# Patient Record
Sex: FEMALE | Race: BLACK OR AFRICAN AMERICAN | Employment: PART TIME | URBAN - METROPOLITAN AREA
[De-identification: names, ages, dates, MRNs, and addresses within clinical notes are randomized per-mention and may not be internally consistent; named-entity substitution may affect disease eponyms.]

---

## 2017-10-04 ENCOUNTER — HOSPITAL ENCOUNTER (OUTPATIENT)
Dept: CT IMAGING | Age: 22
Discharge: HOME | End: 2017-10-04
Attending: SPECIALIST
Payer: COMMERCIAL

## 2017-10-04 DIAGNOSIS — R11.2 NAUSEA WITH VOMITING: ICD-10-CM

## 2017-10-04 DIAGNOSIS — K92.1 HEMATOCHEZIA: ICD-10-CM

## 2017-10-04 PROCEDURE — 74011000258 HC RX REV CODE- 258: Performed by: SPECIALIST

## 2017-10-04 PROCEDURE — 74177 CT ABD & PELVIS W/CONTRAST: CPT

## 2017-10-04 PROCEDURE — 74011636320 HC RX REV CODE- 636/320: Performed by: SPECIALIST

## 2017-10-04 RX ORDER — SODIUM CHLORIDE 0.9 % (FLUSH) 0.9 %
10 SYRINGE (ML) INJECTION
Status: COMPLETED | OUTPATIENT
Start: 2017-10-04 | End: 2017-10-04

## 2017-10-04 RX ADMIN — Medication 10 ML: at 09:18

## 2017-10-04 RX ADMIN — IOPAMIDOL 100 ML: 755 INJECTION, SOLUTION INTRAVENOUS at 09:18

## 2017-10-04 RX ADMIN — SODIUM CHLORIDE 100 ML: 900 INJECTION, SOLUTION INTRAVENOUS at 09:18

## 2017-10-05 ENCOUNTER — OFFICE VISIT (OUTPATIENT)
Dept: NEUROLOGY | Age: 22
End: 2017-10-05

## 2017-10-05 VITALS
RESPIRATION RATE: 14 BRPM | BODY MASS INDEX: 44.15 KG/M2 | DIASTOLIC BLOOD PRESSURE: 88 MMHG | HEART RATE: 112 BPM | WEIGHT: 265 LBS | SYSTOLIC BLOOD PRESSURE: 128 MMHG | HEIGHT: 65 IN | OXYGEN SATURATION: 98 %

## 2017-10-05 DIAGNOSIS — H53.8 BLURRED VISION: Primary | ICD-10-CM

## 2017-10-05 DIAGNOSIS — G44.52 NEW DAILY PERSISTENT HEADACHE: ICD-10-CM

## 2017-10-05 DIAGNOSIS — F41.9 ANXIETY: ICD-10-CM

## 2017-10-05 RX ORDER — CALCIUM CARBONATE 200(500)MG
1 TABLET,CHEWABLE ORAL DAILY
COMMUNITY
End: 2021-09-26 | Stop reason: ALTCHOICE

## 2017-10-05 RX ORDER — ESCITALOPRAM OXALATE 10 MG/1
10 TABLET ORAL
Qty: 30 TAB | Refills: 1 | Status: SHIPPED | OUTPATIENT
Start: 2017-10-05 | End: 2021-10-27 | Stop reason: ALTCHOICE

## 2017-10-05 RX ORDER — PHENOL/SODIUM PHENOLATE
20 AEROSOL, SPRAY (ML) MUCOUS MEMBRANE DAILY
COMMUNITY
End: 2021-09-26 | Stop reason: ALTCHOICE

## 2017-10-05 NOTE — LETTER
10/5/2017 3:30 PM 
 
Patient:  Madeline Vasquez YOB: 1995 Date of Visit: 10/5/2017 Dear Brennen Moser MD 
[address] [facility] [address] [id number] VIA Facsimile: [phone] 
 : Thank you for referring Ms. Madeline Vasquez to me for evaluation/treatment. Below are the relevant portions of my assessment and plan of care. If you have questions, please do not hesitate to call me. I look forward to following Ms. Mack along with you. Sincerely, Karrie Mejia MD

## 2017-10-05 NOTE — PATIENT INSTRUCTIONS
Migraine Headache: Care Instructions  Your Care Instructions  Migraines are painful, throbbing headaches that often start on one side of the head. They may cause nausea and vomiting and make you sensitive to light, sound, or smell. Without treatment, migraines can last from 4 hours to a few days. Medicines can help prevent migraines or stop them after they have started. Your doctor can help you find which ones work best for you. Follow-up care is a key part of your treatment and safety. Be sure to make and go to all appointments, and call your doctor if you are having problems. It's also a good idea to know your test results and keep a list of the medicines you take. How can you care for yourself at home? · Do not drive if you have taken a prescription pain medicine. · Rest in a quiet, dark room until your headache is gone. Close your eyes, and try to relax or go to sleep. Don't watch TV or read. · Put a cold, moist cloth or cold pack on the painful area for 10 to 20 minutes at a time. Put a thin cloth between the cold pack and your skin. · Use a warm, moist towel or a heating pad set on low to relax tight shoulder and neck muscles. · Have someone gently massage your neck and shoulders. · Take your medicines exactly as prescribed. Call your doctor if you think you are having a problem with your medicine. You will get more details on the specific medicines your doctor prescribes. · Be careful not to take pain medicine more often than the instructions allow. You could get worse or more frequent headaches when the medicine wears off. To prevent migraines  · Keep a headache diary so you can figure out what triggers your headaches. Avoiding triggers may help you prevent headaches. Record when each headache began, how long it lasted, and what the pain was like.  (Was it throbbing, aching, stabbing, or dull?) Write down any other symptoms you had with the headache, such as nausea, flashing lights or dark spots, or sensitivity to bright light or loud noise. Note if the headache occurred near your period. List anything that might have triggered the headache. Triggers may include certain foods (chocolate, cheese, wine) or odors, smoke, bright light, stress, or lack of sleep. · If your doctor has prescribed medicine for your migraines, take it as directed. You may have medicine that you take only when you get a migraine and medicine that you take all the time to help prevent migraines. ¨ If your doctor has prescribed medicine for when you get a headache, take it at the first sign of a migraine, unless your doctor has given you other instructions. ¨ If your doctor has prescribed medicine to prevent migraines, take it exactly as prescribed. Call your doctor if you think you are having a problem with your medicine. · Find healthy ways to deal with stress. Migraines are most common during or right after stressful times. Take time to relax before and after you do something that has caused a migraine in the past.  · Try to keep your muscles relaxed by keeping good posture. Check your jaw, face, neck, and shoulder muscles for tension. Try to relax them. When you sit at a desk, change positions often. And make sure to stretch for 30 seconds each hour. · Get plenty of sleep and exercise. · Eat meals on a regular schedule. Avoid foods and drinks that often trigger migraines. These include chocolate, alcohol (especially red wine and port), aspartame, monosodium glutamate (MSG), and some additives found in foods (such as hot dogs, cheung, cold cuts, aged cheeses, and pickled foods). · Limit caffeine. Don't drink too much coffee, tea, or soda. But don't quit caffeine suddenly. That can also give you migraines. · Do not smoke or allow others to smoke around you. If you need help quitting, talk to your doctor about stop-smoking programs and medicines. These can increase your chances of quitting for good.   · If you are taking birth control pills or hormone therapy, talk to your doctor about whether they are triggering your migraines. When should you call for help? Call 911 anytime you think you may need emergency care. For example, call if:  · You have signs of a stroke. These may include:  ¨ Sudden numbness, paralysis, or weakness in your face, arm, or leg, especially on only one side of your body. ¨ Sudden vision changes. ¨ Sudden trouble speaking. ¨ Sudden confusion or trouble understanding simple statements. ¨ Sudden problems with walking or balance. ¨ A sudden, severe headache that is different from past headaches. Call your doctor now or seek immediate medical care if:  · You have new or worse nausea and vomiting. · You have a new or higher fever. · Your headache gets much worse. Watch closely for changes in your health, and be sure to contact your doctor if:  · You are not getting better after 2 days (48 hours). Where can you learn more? Go to http://patrick-john.info/. Enter L007 in the search box to learn more about \"Migraine Headache: Care Instructions. \"  Current as of: October 14, 2016  Content Version: 11.3  © 8994-8551 [employer]. Care instructions adapted under license by [employer] (which disclaims liability or warranty for this information). If you have questions about a medical condition or this instruction, always ask your healthcare professional. [address] any warranty or liability for your use of this information.

## 2017-10-05 NOTE — MR AVS SNAPSHOT
Visit Information Date & Time Provider Department Dept. Phone Encounter #  
 10/5/2017  3:00 PM Fredrick Stevens MD [facility] at [address] [id number] Follow-up Instructions Return in about 2 months (around 12/5/2017). Upcoming Health Maintenance Date Due Hepatitis A Peds Age 1-18 (1 of 2 - Standard Series) 11/14/1998 DTaP/Tdap/Td series (1 - Tdap) 11/14/2004 HPV AGE 9Y-26Y (1 of 3 - Female 3 Dose Series) 11/14/2008 INFLUENZA AGE 9 TO ADULT 8/1/2017 Allergies as of 10/5/2017  Review Complete On: 10/5/2017 By: Fredrick Stevens MD  
 No Known Allergies Current Immunizations  Never Reviewed No immunizations on file. Not reviewed this visit You Were Diagnosed With   
  
 Codes Comments Blurred vision    -  Primary ICD-10-CM: H53.8 ICD-9-CM: 368.8 New daily persistent headache     ICD-10-CM: G44.52 
ICD-9-CM: 339.42 Anxiety     ICD-10-CM: F41.9 ICD-9-CM: 300.00 Vitals BP Pulse Resp Height(growth percentile) Weight(growth percentile) SpO2  
 128/88 (96 %/ 99 %)* (!) 112 14 5' 5\" (1.651 m) (61 %, Z= 0.27) 265 lb (120.2 kg) (>99 %, Z= 2.64) 98% BMI OB Status Smoking Status 44.1 kg/m2 (99 %, Z= 2.27) Unknown Never Smoker *BP percentiles are based on NHBPEP's 4th Report Growth percentiles are based on CDC 2-20 Years data. Vitals History BMI and BSA Data Body Mass Index Body Surface Area  
 44.1 kg/m 2 2.35 m 2 Your Updated Medication List  
  
   
This list is accurate as of: 10/5/17  3:23 PM.  Always use your most recent med list.  
  
  
  
  
 calcium carbonate 200 mg calcium (500 mg) Sienna Franklin Commonly known as:  TUMS Take 1 Tab by mouth daily. escitalopram oxalate 10 mg tablet Commonly known as:  Anuj Basket Take 1 Tab by mouth nightly. LO LOESTRIN FE PO Take  by mouth. Omeprazole delayed release 20 mg tablet Commonly known as:  PRILOSEC D/R Take 20 mg by mouth daily. Prescriptions Printed Refills  
 escitalopram oxalate (LEXAPRO) 10 mg tablet 1 Sig: Take 1 Tab by mouth nightly. Class: Print Route: Oral  
  
We Performed the Following REFERRAL TO OPHTHALMOLOGY [REF57 Custom] Comments:  
 Daily HA, floaters/blurred vision Follow-up Instructions Return in about 2 months (around 12/5/2017). To-Do List   
 10/09/2017 Imaging:  MRI BRAIN WO CONT Patient Instructions Migraine Headache: Care Instructions Your Care Instructions Migraines are painful, throbbing headaches that often start on one side of the head. They may cause nausea and vomiting and make you sensitive to light, sound, or smell. Without treatment, migraines can last from 4 hours to a few days. Medicines can help prevent migraines or stop them after they have started. Your doctor can help you find which ones work best for you. Follow-up care is a key part of your treatment and safety. Be sure to make and go to all appointments, and call your doctor if you are having problems. It's also a good idea to know your test results and keep a list of the medicines you take. How can you care for yourself at home? · Do not drive if you have taken a prescription pain medicine. · Rest in a quiet, dark room until your headache is gone. Close your eyes, and try to relax or go to sleep. Don't watch TV or read. · Put a cold, moist cloth or cold pack on the painful area for 10 to 20 minutes at a time. Put a thin cloth between the cold pack and your skin. · Use a warm, moist towel or a heating pad set on low to relax tight shoulder and neck muscles. · Have someone gently massage your neck and shoulders. · Take your medicines exactly as prescribed. Call your doctor if you think you are having a problem with your medicine. You will get more details on the specific medicines your doctor prescribes. · Be careful not to take pain medicine more often than the instructions allow. You could get worse or more frequent headaches when the medicine wears off. To prevent migraines · Keep a headache diary so you can figure out what triggers your headaches. Avoiding triggers may help you prevent headaches. Record when each headache began, how long it lasted, and what the pain was like. (Was it throbbing, aching, stabbing, or dull?) Write down any other symptoms you had with the headache, such as nausea, flashing lights or dark spots, or sensitivity to bright light or loud noise. Note if the headache occurred near your period. List anything that might have triggered the headache. Triggers may include certain foods (chocolate, cheese, wine) or odors, smoke, bright light, stress, or lack of sleep. · If your doctor has prescribed medicine for your migraines, take it as directed. You may have medicine that you take only when you get a migraine and medicine that you take all the time to help prevent migraines. ¨ If your doctor has prescribed medicine for when you get a headache, take it at the first sign of a migraine, unless your doctor has given you other instructions. ¨ If your doctor has prescribed medicine to prevent migraines, take it exactly as prescribed. Call your doctor if you think you are having a problem with your medicine. · Find healthy ways to deal with stress. Migraines are most common during or right after stressful times. Take time to relax before and after you do something that has caused a migraine in the past. 
· Try to keep your muscles relaxed by keeping good posture. Check your jaw, face, neck, and shoulder muscles for tension. Try to relax them. When you sit at a desk, change positions often. And make sure to stretch for 30 seconds each hour. · Get plenty of sleep and exercise. · Eat meals on a regular schedule.  Avoid foods and drinks that often trigger migraines. These include chocolate, alcohol (especially red wine and port), aspartame, monosodium glutamate (MSG), and some additives found in foods (such as hot dogs, cheung, cold cuts, aged cheeses, and pickled foods). · Limit caffeine. Don't drink too much coffee, tea, or soda. But don't quit caffeine suddenly. That can also give you migraines. · Do not smoke or allow others to smoke around you. If you need help quitting, talk to your doctor about stop-smoking programs and medicines. These can increase your chances of quitting for good. · If you are taking birth control pills or hormone therapy, talk to your doctor about whether they are triggering your migraines. When should you call for help? Call 911 anytime you think you may need emergency care. For example, call if: 
· You have signs of a stroke. These may include: 
¨ Sudden numbness, paralysis, or weakness in your face, arm, or leg, especially on only one side of your body. ¨ Sudden vision changes. ¨ Sudden trouble speaking. ¨ Sudden confusion or trouble understanding simple statements. ¨ Sudden problems with walking or balance. ¨ A sudden, severe headache that is different from past headaches. Call your doctor now or seek immediate medical care if: 
· You have new or worse nausea and vomiting. · You have a new or higher fever. · Your headache gets much worse. Watch closely for changes in your health, and be sure to contact your doctor if: 
· You are not getting better after 2 days (48 hours). Where can you learn more? Go to http://patrick-john.info/. Enter U982 in the search box to learn more about \"Migraine Headache: Care Instructions. \" Current as of: October 14, 2016 Content Version: 11.3 © 8106-7743 [employer]. Care instructions adapted under license by [employer] (which disclaims liability or warranty for this information).  If you have questions about a medical condition or this instruction, always ask your healthcare professional. [place] [address] any warranty or liability for your use of this information. Introducing [facility]! [employer] introduces Heroic patient portal. Now you can access parts of your medical record, email your doctor's office, and request medication refills online. 1. In your internet browser, go to [email or web address] 2. Click on the First Time User? Click Here link in the Sign In box. You will see the New Member Sign Up page. 3. Enter your Heroic Access Code exactly as it appears below. You will not need to use this code after youve completed the sign-up process. If you do not sign up before the expiration date, you must request a new code. · Heroic Access Code: [email or web address] Expires: 1/3/2018  2:44 PM 
 
4. Enter the last four digits of your Social Security Number (xxxx) and Date of Birth (mm/dd/yyyy) as indicated and click Submit. You will be taken to the next sign-up page. 5. Create a Heroic ID. This will be your Heroic login ID and cannot be changed, so think of one that is secure and easy to remember. 6. Create a Heroic password. You can change your password at any time. 7. Enter your Password Reset Question and Answer. This can be used at a later time if you forget your password. 8. Enter your e-mail address. You will receive e-mail notification when new information is available in [address] 9. Click Sign Up. You can now view and download portions of your medical record. 10. Click the Download Summary menu link to download a portable copy of your medical information. If you have questions, please visit the Frequently Asked Questions section of the Heroic website. Remember, Heroic is NOT to be used for urgent needs. For medical emergencies, dial 911. Now available from your iPhone and Android! Please provide this summary of care documentation to your next provider. Your primary care clinician is listed as [address]. If you have any questions after today's visit, please call [phone].

## 2017-10-05 NOTE — PROGRESS NOTES
Patient is here for possible migraines and \"floaters\"  Symptoms for last months  Having floaters all day every day  Will get headaches sometimes  Worse when bright

## 2017-10-05 NOTE — PROGRESS NOTES
Chief Complaint   Patient presents with    Migraine         HISTORY OF PRESENT ILLNESS  Shantell Arias is a 25 y.o. female who came in for neurological consultation requested by Dr. Isabella Harley. She has had mild headaches off and on in the past but for the past month and a half or so she has had a headache almost on a daily basis sometimes it gets quite intense and she is unable to function. She describes that as localized pain on one side, associated with light sensitivity, noise sensitivity, nausea and vomiting. No other associated neurological symptoms. She has kept a headache of intensity 5/10 almost on a daily basis. She has not been getting enough sleeps and barely gets 2-3 hours per night. She has also been feeling very anxious and restless and at times sad. She was prescribed escitalopram but she was concerned about the side effects and has not started taking it yet. She does not take anything for her migraines and just tries to cover her eyes and rest.  Sometimes the headache will wake her up or keep her up at night. She is also having blurring of vision which is a persistent thing. She has been seeing floaters in front of her eyes and recently saw optometrist and her exam was negative. Past Medical History:   Diagnosis Date    Headache      Current Outpatient Prescriptions   Medication Sig    Omeprazole delayed release (PRILOSEC D/R) 20 mg tablet Take 20 mg by mouth daily.  calcium carbonate (TUMS) 200 mg calcium (500 mg) chew Take 1 Tab by mouth daily.  NORETHINDRONE-E.ESTRADIOL-IRON (LO LOESTRIN FE PO) Take  by mouth.  escitalopram oxalate (LEXAPRO) 10 mg tablet Take 1 Tab by mouth nightly. No current facility-administered medications for this visit. No Known Allergies  History reviewed. No pertinent family history. Social History   Substance Use Topics    Smoking status: Never Smoker    Smokeless tobacco: Never Used    Alcohol use No     History reviewed.  No pertinent surgical history. REVIEW OF SYSTEMS  Review of Systems - History obtained from the patient  Psychological ROS: negative  ENT ROS: negative  Hematological and Lymphatic ROS: negative  Endocrine ROS: negative  Respiratory ROS: no cough, shortness of breath, or wheezing  Cardiovascular ROS: no chest pain or dyspnea on exertion  Gastrointestinal ROS: no abdominal pain, change in bowel habits, or black or bloody stools  Genito-Urinary ROS: no dysuria, trouble voiding, or hematuria  Musculoskeletal ROS: negative  Dermatological ROS: negative      PHYSICAL EXAMINATION:    Visit Vitals    /88    Pulse (!) 112    Resp 14    Ht 5' 5\" (1.651 m)    Wt 120.2 kg (265 lb)    SpO2 98%    BMI 44.1 kg/m2     General:  Well defined, nourished, and groomed individual in no acute distress. Neck: Supple, nontender, thyroid within normal limits, no JVD, no bruits, no pain with resistance to active range of motion. Heart: Regular rate and rhythm, no murmurs, rub, or gallop. Normal S1S2. Lungs:  Clear to auscultation bilaterally with equal chest expansion, no cough, no wheeze  Musculoskeletal:  Extremities revealed no edema and had full range of motion of joints. Psych:  Good mood and bright affect    NEUROLOGICAL EXAMINATION:     Mental Status:   Alert and oriented to person, place, and time with recent and remote memory intact. Attention span and concentration are normal. Speech is fluent with a full fund of knowledge. Cranial Nerves:    II, III, IV, VI:  Visual acuity grossly intact. Visual fields are normal.    Pupils are equal, round, and reactive to light and accommodation. Extra-ocular movements are full and fluid. Fundoscopic exam was benign, no ptosis or nystagmus. V-XII: Hearing is grossly intact. Facial features are symmetric, with normal sensation and strength. The palate rises symmetrically and the tongue protrudes midline. Sternocleidomastoids 5/5.       Motor Examination: Normal tone, bulk, and strength. 5/5 muscle strength throughout. No cogwheel rigidity or clonus present. Sensory exam:  Normal throughout to pinprick, temperature, and vibration sense. Normal proprioception. Coordination:  Heel-to-shin was smooth and symmetrical bilaterally. Finger to nose and rapid arm movement testing was normal.   No resting or intention tremor    Gait and Station:  Steady while walking on toes, heels, and with tandem walking. Normal arm swing. No Rhomberg or pronator drift. No muscle wasting or fasiculations noted. Reflexes:  DTRs 2+ throughout. Toes downgoing. ASSESSMENT    ICD-10-CM ICD-9-CM    1. Blurred vision H53.8 368.8 MRI BRAIN WO CONT      REFERRAL TO OPHTHALMOLOGY   2. New daily persistent headache G44.52 339.42 MRI BRAIN WO CONT      REFERRAL TO OPHTHALMOLOGY   3. Anxiety F41.9 300.00 escitalopram oxalate (LEXAPRO) 10 mg tablet       DISCUSSION  Ms. Carroll Sarkar has had a daily persistent headache for the past 6 weeks associated with blurry vision and seeing floaters in front of her eyes. Although this may be a transformed migraine but she does not have a clear history of migraines and given associated visual symptoms and negative fundus exam I have recommended brain imaging and also detailed ophthalmology evaluation. She should take ibuprofen or naproxen as needed for headache exacerbation and start Lexapro at bedtime  Follow-up in 6- 8 weeks    Thank you for allowing me to participate in the care of Ms. Mack. Please feel free to contact me if you have any questions. I will be happy to follow to follow her along with you.       Ivania Mann MD  Diplomate, American Board of Psychiatry & Neurology (Neurology)  [place] Board of Psychiatry & Neurology (Clinical Neurophysiology)  Diplomate, American Board of Electrodiagnostic Medicine

## 2017-10-16 ENCOUNTER — HOSPITAL ENCOUNTER (OUTPATIENT)
Dept: MRI IMAGING | Age: 22
Discharge: HOME | End: 2017-10-16
Attending: PSYCHIATRY & NEUROLOGY
Payer: COMMERCIAL

## 2017-10-16 DIAGNOSIS — G44.52 NEW DAILY PERSISTENT HEADACHE: ICD-10-CM

## 2017-10-16 DIAGNOSIS — H53.8 BLURRED VISION: ICD-10-CM

## 2017-10-16 PROCEDURE — 70551 MRI BRAIN STEM W/O DYE: CPT

## 2017-10-17 ENCOUNTER — TELEPHONE (OUTPATIENT)
Dept: NEUROLOGY | Age: 22
End: 2017-10-17

## 2017-10-17 NOTE — TELEPHONE ENCOUNTER
----- Message from Rui Stephens MD sent at 10/17/2017  8:37 AM EDT -----  MRI brain was normal.  Please inform.

## 2017-12-05 ENCOUNTER — OFFICE VISIT (OUTPATIENT)
Dept: NEUROLOGY | Age: 22
End: 2017-12-05

## 2017-12-05 VITALS
BODY MASS INDEX: 44.98 KG/M2 | SYSTOLIC BLOOD PRESSURE: 130 MMHG | HEIGHT: 65 IN | DIASTOLIC BLOOD PRESSURE: 80 MMHG | WEIGHT: 270 LBS | OXYGEN SATURATION: 96 % | RESPIRATION RATE: 14 BRPM | HEART RATE: 103 BPM

## 2017-12-05 DIAGNOSIS — R51.9 DAILY HEADACHE: Primary | ICD-10-CM

## 2017-12-05 DIAGNOSIS — H93.A9 PULSATILE TINNITUS: ICD-10-CM

## 2017-12-05 DIAGNOSIS — H53.8 BLURRY VISION: ICD-10-CM

## 2017-12-05 NOTE — PATIENT INSTRUCTIONS

## 2017-12-05 NOTE — PROGRESS NOTES
Patient is still getting migraines  Still getting floaters multiple times a week.  Not always having the pain

## 2017-12-05 NOTE — MR AVS SNAPSHOT
Visit Information Date & Time Provider Department Dept. Phone Encounter #  
 12/5/2017  3:30 PM Esau Bryson MD [facility] [place] [phone] Follow-up Instructions Return in about 2 months (around 2/5/2018). Upcoming Health Maintenance Date Due Hepatitis A Peds Age 1-18 (1 of 2 - Standard Series) 11/14/1998 DTaP/Tdap/Td series (1 - Tdap) 11/14/2004 HPV AGE 9Y-26Y (1 of 3 - Female 3 Dose Series) 11/14/2008 Influenza Age 5 to Adult 8/1/2017 Allergies as of 12/5/2017  Review Complete On: 12/5/2017 By: Esau Bryson MD  
 No Known Allergies Current Immunizations  Never Reviewed No immunizations on file. Not reviewed this visit You Were Diagnosed With   
  
 Codes Comments Daily headache    -  Primary ICD-10-CM: Q97 ICD-9-CM: 784.0 Pulsatile tinnitus     ICD-10-CM: H93. A9 ICD-9-CM: 388.30 Blurry vision     ICD-10-CM: H53.8 ICD-9-CM: 368.8 Vitals BP Pulse Resp Height(growth percentile) Weight(growth percentile) SpO2  
 130/80 (!) 103 14 5' 5\" (1.651 m) 270 lb (122.5 kg) 96% BMI OB Status Smoking Status 44.93 kg/m2 Unknown Never Smoker Vitals History BMI and BSA Data Body Mass Index Body Surface Area 44.93 kg/m 2 2.37 m 2 Your Updated Medication List  
  
   
This list is accurate as of: 12/5/17  4:07 PM.  Always use your most recent med list.  
  
  
  
  
 calcium carbonate 200 mg calcium (500 mg) Somerton Presser Commonly known as:  TUMS Take 1 Tab by mouth daily. escitalopram oxalate 10 mg tablet Commonly known as:  Shan Barrs Take 1 Tab by mouth nightly. LO LOESTRIN FE PO Take  by mouth. Omeprazole delayed release 20 mg tablet Commonly known as:  PRILOSEC D/R Take 20 mg by mouth daily. Follow-up Instructions Return in about 2 months (around 2/5/2018). To-Do List   
 12/11/2017 Imaging:  MRA BRAIN W WO CONT   
  
 12/11/2017 Imaging:  MRA BRAIN WO CONT Referral Information Referral ID Referred By Referred To  
  
 [id number] Troy SALAZAR Not Available Visits Status Start Date End Date 1 New Request 12/5/17 12/5/18 If your referral has a status of pending review or denied, additional information will be sent to support the outcome of this decision. Referral ID Referred By Referred To  
 [id number] Troy SALAZAR Not Available Visits Status Start Date End Date 1 New Request 12/5/17 12/5/18 If your referral has a status of pending review or denied, additional information will be sent to support the outcome of this decision. Patient Instructions A Healthy Lifestyle: Care Instructions Your Care Instructions A healthy lifestyle can help you feel good, stay at a healthy weight, and have plenty of energy for both work and play. A healthy lifestyle is something you can share with your whole family. A healthy lifestyle also can lower your risk for serious health problems, such as high blood pressure, heart disease, and diabetes. You can follow a few steps listed below to improve your health and the health of your family. Follow-up care is a key part of your treatment and safety. Be sure to make and go to all appointments, and call your doctor if you are having problems. It's also a good idea to know your test results and keep a list of the medicines you take. How can you care for yourself at home? · Do not eat too much sugar, fat, or fast foods. You can still have dessert and treats now and then. The goal is moderation. · Start small to improve your eating habits. Pay attention to portion sizes, drink less juice and soda pop, and eat more fruits and vegetables. ¨ Eat a healthy amount of food. A 3-ounce serving of meat, for example, is about the size of a deck of cards.  Fill the rest of your plate with vegetables and whole grains. ¨ Limit the amount of soda and sports drinks you have every day. Drink more water when you are thirsty. ¨ Eat at least 5 servings of fruits and vegetables every day. It may seem like a lot, but it is not hard to reach this goal. A serving or helping is 1 piece of fruit, 1 cup of vegetables, or 2 cups of leafy, raw vegetables. Have an apple or some carrot sticks as an afternoon snack instead of a candy bar. Try to have fruits and/or vegetables at every meal. 
· Make exercise part of your daily routine. You may want to start with simple activities, such as walking, bicycling, or slow swimming. Try to be active 30 to 60 minutes every day. You do not need to do all 30 to 60 minutes all at once. For example, you can exercise 3 times a day for 10 or 20 minutes. Moderate exercise is safe for most people, but it is always a good idea to talk to your doctor before starting an exercise program. 
· Keep moving. Tad Arielle the lawn, work in the garden, or Z[employer]ow. Take the stairs instead of the elevator at work. · If you smoke, quit. People who smoke have an increased risk for heart attack, stroke, cancer, and other lung illnesses. Quitting is hard, but there are ways to boost your chance of quitting tobacco for good. ¨ Use nicotine gum, patches, or lozenges. ¨ Ask your doctor about stop-smoking programs and medicines. ¨ Keep trying. In addition to reducing your risk of diseases in the future, you will notice some benefits soon after you stop using tobacco. If you have shortness of breath or asthma symptoms, they will likely get better within a few weeks after you quit. · Limit how much alcohol you drink. Moderate amounts of alcohol (up to 2 drinks a day for men, 1 drink a day for women) are okay. But drinking too much can lead to liver problems, high blood pressure, and other health problems. Family health If you have a family, there are many things you can do together to improve your health. · Eat meals together as a family as often as possible. · Eat healthy foods. This includes fruits, vegetables, lean meats and dairy, and whole grains. · Include your family in your fitness plan. Most people think of activities such as jogging or tennis as the way to fitness, but there are many ways you and your family can be more active. Anything that makes you breathe hard and gets your heart pumping is exercise. Here are some tips: 
¨ Walk to do errands or to take your child to school or the bus. ¨ Go for a family bike ride after dinner instead of watching TV. Where can you learn more? Go to http://patrickBanister Worksjohn.info/. Enter H496 in the search box to learn more about \"A Healthy Lifestyle: Care Instructions. \" Current as of: May 12, 2017 Content Version: 11.4 © 5020-7080 [employer]. Care instructions adapted under license by [employer] (which disclaims liability or warranty for this information). If you have questions about a medical condition or this instruction, always ask your healthcare professional. [place] [address] any warranty or liability for your use of this information. Introducing [facility]! [address] introduces Socialize patient portal. Now you can access parts of your medical record, email your doctor's office, and request medication refills online. 1. In your internet browser, go to [email or web address] 2. Click on the First Time User? Click Here link in the Sign In box. You will see the New Member Sign Up page. 3. Enter your Socialize Access Code exactly as it appears below. You will not need to use this code after youve completed the sign-up process. If you do not sign up before the expiration date, you must request a new code. · Socialize Access Code: [email or web address] Expires: 1/3/2018  1:44 PM 
 
4.  Enter the last four digits of your Social Security Number (xxxx) and Date of Birth (mm/dd/yyyy) as indicated and click Submit. You will be taken to the next sign-up page. 5. Create a [employer] ID. This will be your [employer] login ID and cannot be changed, so think of one that is secure and easy to remember. 6. Create a [employer] password. You can change your password at any time. 7. Enter your Password Reset Question and Answer. This can be used at a later time if you forget your password. 8. Enter your e-mail address. You will receive e-mail notification when new information is available in [address] 9. Click Sign Up. You can now view and download portions of your medical record. 10. Click the Download Summary menu link to download a portable copy of your medical information. If you have questions, please visit the Frequently Asked Questions section of the [employer] website. Remember, [employer] is NOT to be used for urgent needs. For medical emergencies, dial 911. Now available from your iPhone and Android! Please provide this summary of care documentation to your next provider. Your primary care clinician is listed as [address]. If you have any questions after today's visit, please call [phone].

## 2017-12-05 NOTE — PROGRESS NOTES
Chief Complaint   Patient presents with    Migraine         HISTORY OF PRESENT ILLNESS  Radha Low 89 came back for follow-up. She says that she is doing slightly better from headache standpoint but continues to experiencing blurry vision and floaters off and on. She did see ophthalmology and her visual field testing was normal.  There was no disc edema. She had an MRI scan of the brain that came back normal.  She is experiencing ringing sounds in her ears that sometimes is in sync with the heartbeat. This seems to be the most bothersome symptom. She was prescribed Lexapro to help with anxiety and to sleep at night but she never took it as she was concerned about the side effects. She has been talking to a counselor and is feeling somewhat better. She started having headaches about 3 months ago and was having them almost on a daily basis sometimes it gets quite intense and she is unable to function. She describes that as localized pain on one side, associated with light sensitivity, noise sensitivity, nausea and vomiting. She has not been getting enough sleeps and barely gets 2-3 hours per night. She has also been feeling very anxious and restless and at times sad. Past Medical History:   Diagnosis Date    Headache      Current Outpatient Prescriptions   Medication Sig    Omeprazole delayed release (PRILOSEC D/R) 20 mg tablet Take 20 mg by mouth daily.  calcium carbonate (TUMS) 200 mg calcium (500 mg) chew Take 1 Tab by mouth daily.  NORETHINDRONE-E.ESTRADIOL-IRON (LO LOESTRIN FE PO) Take  by mouth.  escitalopram oxalate (LEXAPRO) 10 mg tablet Take 1 Tab by mouth nightly. No current facility-administered medications for this visit.         PHYSICAL EXAMINATION:    Visit Vitals    /80    Pulse (!) 103    Resp 14    Ht 5' 5\" (1.651 m)    Wt 122.5 kg (270 lb)    SpO2 96%    BMI 44.93 kg/m2     NEUROLOGICAL EXAMINATION:     Mental Status:   Alert and oriented to person, place, and time with recent and remote memory intact. Attention span and concentration are normal. Speech is fluent with a full fund of knowledge. Cranial Nerves:    II, III, IV, VI:  Visual acuity grossly intact. Visual fields are normal.    Pupils are equal, round, and reactive to light and accommodation. Extra-ocular movements are full and fluid. Fundoscopic exam was benign, no ptosis or nystagmus. V-XII: Hearing is grossly intact. Facial features are symmetric, with normal sensation and strength. The palate rises symmetrically and the tongue protrudes midline. Sternocleidomastoids 5/5. Motor Examination: Normal tone, bulk, and strength. 5/5 muscle strength throughout. No cogwheel rigidity or clonus present. Sensory exam:  Normal throughout to pinprick, temperature, and vibration sense. Normal proprioception. Coordination:  Heel-to-shin was smooth and symmetrical bilaterally. Finger to nose and rapid arm movement testing was normal.   No resting or intention tremor    Gait and Station:  Steady while walking on toes, heels, and with tandem walking. Normal arm swing. No Rhomberg or pronator drift. No muscle wasting or fasiculations noted. Reflexes:  DTRs 2+ throughout. Toes downgoing. ASSESSMENT    ICD-10-CM ICD-9-CM    1. Daily headache R51 784.0 MRA BRAIN W WO CONT      MRA BRAIN WO CONT   2. Pulsatile tinnitus H93. A9 388.30 MRA BRAIN W WO CONT      MRA BRAIN WO CONT   3. Blurry vision H53.8 368.8 MRA BRAIN W WO CONT      MRA BRAIN WO CONT       DISCUSSION  Ms. Mauricio Olivas has had a daily persistent headache associated with pulsatile tinnitus and visual disturbances. Her brain imaging and ophthalmology exam has been negative. Due to persistent symptoms, I have recommended MR angiography of the brain and MR venogram to rule out any AV malformation, aneurysm or fistula.   If this is nonrevealing, next step would be to check lumbar puncture and opening pressure as rarely pseudotumor can present with these symptoms but without any disc edema.    I have encouraged her to try Lexapro to help with her anxiety  Use NSAIDs as needed for headaches  Still considering migraines as a possible etiology of fall her symptoms      Natacha Viera MD  Diplomate, American Board of Psychiatry & Neurology (Neurology)  [facility] Psychiatry & Neurology (Clinical Neurophysiology)  Diplomate, American Board of Electrodiagnostic Medicine

## 2017-12-08 ENCOUNTER — TELEPHONE (OUTPATIENT)
Dept: NEUROLOGY | Age: 22
End: 2017-12-08

## 2017-12-13 ENCOUNTER — TELEPHONE (OUTPATIENT)
Dept: NEUROLOGY | Age: 22
End: 2017-12-13

## 2017-12-13 NOTE — TELEPHONE ENCOUNTER
Jose Carlos Franz with coordination of care called, wanted to know if patient needed MRA with contrast and without, or just without.

## 2017-12-13 NOTE — TELEPHONE ENCOUNTER
Left message with Carmelita Whitmore with coordination of care, per  MRA with contrast and also MR venogram with contrast.    Any questions, to call back.

## 2017-12-14 DIAGNOSIS — R51.9 DAILY HEADACHE: Primary | ICD-10-CM

## 2017-12-14 DIAGNOSIS — H93.A9 PULSATILE TINNITUS: ICD-10-CM

## 2017-12-20 ENCOUNTER — TELEPHONE (OUTPATIENT)
Dept: NEUROLOGY | Age: 22
End: 2017-12-20

## 2018-01-12 DIAGNOSIS — H93.A9 PULSATILE TINNITUS: Primary | ICD-10-CM

## 2018-01-13 ENCOUNTER — HOSPITAL ENCOUNTER (OUTPATIENT)
Dept: MRI IMAGING | Age: 23
Discharge: HOME | End: 2018-01-13
Attending: PSYCHIATRY & NEUROLOGY
Payer: COMMERCIAL

## 2018-01-13 DIAGNOSIS — H53.8 BLURRY VISION: ICD-10-CM

## 2018-01-13 DIAGNOSIS — H93.A9 PULSATILE TINNITUS: ICD-10-CM

## 2018-01-13 DIAGNOSIS — R51.9 DAILY HEADACHE: ICD-10-CM

## 2018-01-13 PROCEDURE — 70551 MRI BRAIN STEM W/O DYE: CPT

## 2018-01-13 PROCEDURE — 70544 MR ANGIOGRAPHY HEAD W/O DYE: CPT

## 2018-01-15 ENCOUNTER — TELEPHONE (OUTPATIENT)
Dept: NEUROLOGY | Age: 23
End: 2018-01-15

## 2018-01-15 NOTE — TELEPHONE ENCOUNTER
----- Message from Yazan Menon MD sent at 1/15/2018 10:20 AM EST -----  MRA MRV brain came back Okay. Please inform.

## 2018-01-15 NOTE — TELEPHONE ENCOUNTER
Spoke with patient about imaging. Patient verbalized understanding, was given opportunity to ask questions.

## 2021-09-09 DIAGNOSIS — Z76.89 ENCOUNTER FOR WEIGHT MANAGEMENT: Primary | ICD-10-CM

## 2021-09-09 DIAGNOSIS — E66.01 MORBID OBESITY (HCC): ICD-10-CM

## 2021-09-21 ENCOUNTER — OFFICE VISIT (OUTPATIENT)
Dept: SURGERY | Age: 26
End: 2021-09-21
Payer: COMMERCIAL

## 2021-09-21 VITALS
RESPIRATION RATE: 18 BRPM | BODY MASS INDEX: 48.82 KG/M2 | HEIGHT: 65 IN | OXYGEN SATURATION: 98 % | WEIGHT: 293 LBS | DIASTOLIC BLOOD PRESSURE: 80 MMHG | SYSTOLIC BLOOD PRESSURE: 146 MMHG | HEART RATE: 101 BPM

## 2021-09-21 DIAGNOSIS — Z13.1 SCREENING FOR DIABETES MELLITUS: ICD-10-CM

## 2021-09-21 DIAGNOSIS — R40.0 DAYTIME SOMNOLENCE: ICD-10-CM

## 2021-09-21 DIAGNOSIS — E66.01 CLASS 3 OBESITY (HCC): Primary | ICD-10-CM

## 2021-09-21 DIAGNOSIS — Z13.220 SCREENING FOR HYPERLIPIDEMIA: ICD-10-CM

## 2021-09-21 DIAGNOSIS — Z13.29 SCREENING FOR HYPOTHYROIDISM: ICD-10-CM

## 2021-09-21 DIAGNOSIS — G47.8 UNREFRESHED BY SLEEP: ICD-10-CM

## 2021-09-21 PROCEDURE — 99204 OFFICE O/P NEW MOD 45 MIN: CPT | Performed by: FAMILY MEDICINE

## 2021-09-21 RX ORDER — METFORMIN HYDROCHLORIDE 500 MG/1
TABLET, EXTENDED RELEASE ORAL
COMMUNITY

## 2021-09-21 NOTE — PROGRESS NOTES
[facility] Progress Note: Initial Physician Visit     Tabatha Bazan is a 23 y.o. female who is here for medical screening for entering the [facility].     CC:  Obesity    VLCD    Weight History  I personally reviewed the Medical Screening [place] Supriya completed by patient and scanned into media section of chart. It includes duration of their obesity, maximum weight, goal weight and weight gain time line (timing), all of which give the context of their obesity AND a Family History of their obesity. Is their Weight Loss Goal entered in to Comments? Weight loss History  I personally reviewed the Medical Screening [place] Supriya completed by the patient and scanned into media section of chart. It includes number of weight loss attempts, the weight loss program that patients was most successful using, and if they have any hx of anorectic medication use, including OTC supplements. This captures modifying factors. Significant Medical History  Past Medical History:   Diagnosis Date    Arrhythmia     Headache     Morbid obesity ([facility] [address]        I personally reviewed the Medical Screening [place] Supriya completed by the patient and scanned into media section of chart. This allows me to assess associated symptoms that are significant in the assessment of the patient's obesity and the patient's Past Medical History.     Outpatient Medications Marked as Taking for the 9/21/21 encounter (Office Visit) with Renay Muñoz MD   Medication Sig Dispense Refill    metFORMIN ER (GLUCOPHAGE XR) 500 mg tablet metformin  mg tablet,extended release 24 hr   TAKE 1 TABLET BY MOUTH TWICE DAILY **Needs visit for further refills         SLEEP: 6-7      Significant Psychosocial History   Has a doctor every diagnosed with Binge Eating Disorder, Bulemia or Anorexia? : no     Compliance  Upcoming Travel? no    Social History  Social History     Tobacco Use    Smoking status: Never Smoker    Smokeless tobacco: Never Used   Substance Use Topics    Alcohol use: Yes     Alcohol/week: 10.0 standard drinks     Types: 10 Shots of liquor per week     Comment: Every weekend       Exercise  I personally reviewed the Medical Screening Ludwig Mcgee completed by the patient and scanned into media section of chart. Review of Systems  See HPI        Objective  Visit Vitals  BP (!) 146/80 (BP 1 Location: Right arm, BP Patient Position: Sitting)   Pulse (!) 101   Resp 18   Ht 5' 5\" (1.651 m)   Wt 294 lb 9.6 oz (133.6 kg)   SpO2 98%   BMI 49.02 kg/m²         Weight Metrics 9/21/2021 9/21/2021 12/5/2017 10/5/2017   Weight - 294 lb 9.6 oz 270 lb 265 lb   Neck Circ (inches) 16.5 - - -   Waist Measure Inches 46 - - -   Body Fat % 46.7 - - -   BMI - 49.02 kg/m2 44.93 kg/m2 44.1 kg/m2       Labs: See  labs scanned into Media section or in lab section of record      Physical Exam    Vital Signs Reviewed  Weight Management Metrics Reviewed    Appearance: well  HEENT:  Scleral icterus?  no  Neck:  Thyromegaly or nodules? Very thick neck    Heart:  rrr  Lungs:  clear  Abdomen:     Hepatomegaly? no   Striae present? no  Skin:    Acne?  no   Hirsutism? no   Skin tags? no   Acanthosis Nigricans?  no  Ext:  Edema? no      Assessment & Plan  Encounter Diagnoses   Name Primary?  Class 3 obesity Yes    BMI 45.0-49.9, adult ([address]     Unrefreshed by sleep     Daytime somnolence     Screening for diabetes mellitus     Screening for hyperlipidemia     Screening for hypothyroidism        1. labs reviewed w/ patient  2. EKG report pending    She has signs and symptoms of STEPHANI which causes resistance to weight loss  She agrees to have testing  Also checking for diabetes and high cholesterol    Frequent headaches:  Was tested for pseudotumor ceribri in 2017  Needs also testing for STEPHANI    3.  Medication changes include: none today    Based on his history, labs, Rosaline Munoz is   a good candidate for the [facility]      time with Radha VILLATORO consisted of counseling & coordinating and/or discussing treatment plans in reference to her obesity The primary encounter diagnosis was Class 3 obesity. Diagnoses of BMI 45.0-49.9, adult ([facility] [address] Unrefreshed by sleep, Daytime somnolence, Screening for diabetes mellitus, Screening for hyperlipidemia, and Screening for hypothyroidism were also pertinent to this visit.

## 2021-09-22 LAB
ALBUMIN SERPL-MCNC: 4.2 G/DL (ref 3.9–5)
ALBUMIN/GLOB SERPL: 1.3 {RATIO} (ref 1.2–2.2)
ALP SERPL-CCNC: 66 IU/L (ref 44–121)
ALT SERPL-CCNC: 13 IU/L (ref 0–32)
AST SERPL-CCNC: 12 IU/L (ref 0–40)
BILIRUB SERPL-MCNC: 0.4 MG/DL (ref 0–1.2)
BUN SERPL-MCNC: 12 MG/DL (ref 6–20)
BUN/CREAT SERPL: 17 (ref 9–23)
CALCIUM SERPL-MCNC: 9.5 MG/DL (ref 8.7–10.2)
CHLORIDE SERPL-SCNC: 106 MMOL/L (ref 96–106)
CHOLEST SERPL-MCNC: 140 MG/DL (ref 100–199)
CO2 SERPL-SCNC: 22 MMOL/L (ref 20–29)
CREAT SERPL-MCNC: 0.69 MG/DL (ref 0.57–1)
EST. AVERAGE GLUCOSE BLD GHB EST-MCNC: 100 MG/DL
GLOBULIN SER CALC-MCNC: 3.3 G/DL (ref 1.5–4.5)
GLUCOSE SERPL-MCNC: 93 MG/DL (ref 65–99)
HBA1C MFR BLD: 5.1 % (ref 4.8–5.6)
HDLC SERPL-MCNC: 51 MG/DL
LDLC SERPL CALC-MCNC: 67 MG/DL (ref 0–99)
POTASSIUM SERPL-SCNC: 4.1 MMOL/L (ref 3.5–5.2)
PROT SERPL-MCNC: 7.5 G/DL (ref 6–8.5)
SODIUM SERPL-SCNC: 140 MMOL/L (ref 134–144)
TRIGL SERPL-MCNC: 126 MG/DL (ref 0–149)
TSH SERPL DL<=0.005 MIU/L-ACNC: 1.15 UIU/ML (ref 0.45–4.5)
VLDLC SERPL CALC-MCNC: 22 MG/DL (ref 5–40)

## 2021-09-23 ENCOUNTER — OFFICE VISIT (OUTPATIENT)
Dept: SURGERY | Age: 26
End: 2021-09-23

## 2021-09-28 ENCOUNTER — TELEPHONE (OUTPATIENT)
Dept: SURGERY | Age: 26
End: 2021-09-28

## 2021-09-28 NOTE — TELEPHONE ENCOUNTER
Attempted to call patient to start our virtual nutrition visit today, 9/29, at 11:30am.  Left voicemail letting her know I would remain logged in to the virtual meeting an additional 5 minutes until 11:50am.  Also left office phone number if she needed to reschedule [phone]. I did ask office to call patient ahead of our visit to notify her I was running about 10-15 minutes behind schedule.

## 2021-09-29 NOTE — PROGRESS NOTES
[facility]  Metabolic Weight Loss Program        Patient's Name: Radha Vidal  : 1995    This patient is enrolled in [address] [place] [address] Weight Loss Program and attended the required weekly virtual nutrition class hosted via [employer] today.       Epi Florian, MS, RD, LDN

## 2021-10-04 ENCOUNTER — TELEPHONE (OUTPATIENT)
Dept: SURGERY | Age: 26
End: 2021-10-04

## 2021-10-04 NOTE — TELEPHONE ENCOUNTER
Called patient to notify her I was logged into our virtual nutrition consult scheduled for today at 2:00pm.  Offered phone number [phone] if she needed to reschedule and/or was having difficulty logging in. Also said I will remain logged into our virtual visit until 2:15pm, for the entire 15 minute late ann period in case she was having trouble connecting.

## 2021-10-05 NOTE — PROGRESS NOTES
The liver and kidney tests are normal  The blood sugar is normal  The cholesterol is normal  The thyroid test is normal

## 2021-10-27 ENCOUNTER — VIRTUAL VISIT (OUTPATIENT)
Dept: SURGERY | Age: 26
End: 2021-10-27
Payer: COMMERCIAL

## 2021-10-27 DIAGNOSIS — G47.8 UNREFRESHED BY SLEEP: ICD-10-CM

## 2021-10-27 DIAGNOSIS — E66.01 CLASS 3 OBESITY (HCC): Primary | ICD-10-CM

## 2021-10-27 PROCEDURE — 99214 OFFICE O/P EST MOD 30 MIN: CPT | Performed by: FAMILY MEDICINE

## 2021-10-27 RX ORDER — BUPROPION HYDROCHLORIDE 150 MG/1
150 TABLET, EXTENDED RELEASE ORAL 2 TIMES DAILY
Qty: 60 TABLET | Refills: 0
Start: 2021-10-27

## 2021-10-27 NOTE — PROGRESS NOTES
1 month follow up. 1. Have you been to the ER, urgent care clinic since your last visit? Hospitalized since your last visit? No    2. Have you seen or consulted any other health care providers outside of the [address] [place] [address] since your last visit? Include any pap smears or colon screening.  No

## 2021-10-27 NOTE — PATIENT INSTRUCTIONS
[place] DIET     Drink 64 ounces of water each day  Exercise at least 150 mins a week    Breakfast  Either a premier protein meal replacement*( 30 g of protein) or 2 boiled eggs  And 1 piece of fruit( apple, orange, banana, grapefruit or pear)      Lunch  Either a premier protein drink* (30 G ) or 3 ounces of lean meat and 2 servings of any leafy green vegetables. Can also have as 1 option for vegetables garcia beans or green peas.   AND 1 piece of fruit as listed above    Snack: premier protein drink*    Dinner  4 ounces of lean meat with 2 servings of vegetables ( as listed above)  Also may have a small-medium baked sweet potato    For tea or coffee use stevia sweetener            *if lactose intolerant use Gupta protein powder or premixed drink as the meal replacement

## 2021-10-27 NOTE — PROGRESS NOTES
[facility] Progress Note:   F/up Physician Visit    Magdaline Dandy is a 23 y.o. female who was seen by synchronous (real-time) audio-video technology on 10/27/2021. Consent:  She and/or her healthcare decision maker is aware that this patient-initiated Telehealth encounter is a billable service, with coverage as determined by her insurance carrier. She is aware that she may receive a bill and has provided verbal consent to proceed: Yes    I was at home while conducting this encounter. 712  Subjective:   Magdaline Dandy was seen for Weight Management      She started on VLCD  She cannot afford the replacements and wanst to switch to a grocery plan  And possible prescriptions  She did one virtual meeting so far    She was prescribed some meds in the past through [facility] but she did not it because she was afraid it might make her anxious  Bupropion and naltrexone= contrave      f/up physician visit for the VLCD / LCD Program.    Prior to Admission medications    Medication Sig Start Date End Date Taking? Authorizing Provider   buPROPion SR [facility] SR) 150 mg SR tablet Take 1 Tablet by mouth two (2) times a day. 10/27/21  Yes Curtis Tolbert MD   metFORMIN ER (GLUCOPHAGE XR) 500 mg tablet metformin  mg tablet,extended release 24 hr   TAKE 1 TABLET BY MOUTH TWICE DAILY **Needs visit for further refills   Yes Provider, Historical   NORETHINDRONE-E.ESTRADIOL-IRON (LO LOESTRIN FE PO) Take  by mouth. Provider, Historical     No Known Allergies    There are no problems to display for this patient. Current Outpatient Medications   Medication Sig Dispense Refill    buPROPion SR (WELLBUTRIN SR) 150 mg SR tablet Take 1 Tablet by mouth two (2) times a day.  60 Tablet 0    metFORMIN ER (GLUCOPHAGE XR) 500 mg tablet metformin  mg tablet,extended release 24 hr   TAKE 1 TABLET BY MOUTH TWICE DAILY **Needs visit for further refills      NORETHINDRONE-E.ESTRADIOL-IRON (LO LOESTRIN FE PO) Take  by mouth. ROS      CC: Weight Management      Ayo Mercedes is a 23 y.o. female who is here for her      Weight Metrics 9/21/2021 9/21/2021 12/5/2017 10/5/2017   Weight - 294 lb 9.6 oz 270 lb 265 lb   Neck Circ (inches) 16.5 - - -   Waist Measure Inches 46 - - -   Body Fat % 46.7 - - -   BMI - 49.02 kg/m2 44.93 kg/m2 44.1 kg/m2         Outpatient Medications Marked as Taking for the 10/27/21 encounter (Virtual Visit) with Keith Lewis MD   Medication Sig Dispense Refill    buPROPion SR [facility] SR) 150 mg SR tablet Take 1 Tablet by mouth two (2) times a day. 60 Tablet 0    metFORMIN ER (GLUCOPHAGE XR) 500 mg tablet metformin  mg tablet,extended release 24 hr   TAKE 1 TABLET BY MOUTH TWICE DAILY **Needs visit for further refills           Participation   Did you attend clinic and class last week? no    Review of Systems  Since your last visit, have you experienced any complications? no  If yes, please list:       Are you taking an appetite suppressant? no  If so, is there any Chest Pain, Palpitations or Dizziness? HUNGER CONTROL: no    BP Readings from Last 3 Encounters:   09/21/21 (!) 146/80   12/05/17 130/80   10/05/17 128/88       SLEEP:  6-7    Have you received any other medical care this week? no  If yes, where and for what? Have you discontinued or changed any medicine or dose of your medicine since your last visit with Dr Mehrdad Collins? no  If yes, where and for what? Diet  How many ounces of calorie-free liquids did you consume each day? 64 oz    How many meal replacements did you take each day? *0    Did you have any problems adhering to the program?  no If yes, please explain:      Exercise  Aerobic exercise: 0 min  Resistance exercise: 0 workouts / week  Any discomfort?  no     If yes, where? Objective  There were no vitals taken for this visit. No LMP recorded.                     PHYSICAL EXAMINATION:  [ INSTRUCTIONS:  \"[x]\" Indicates a positive item  \"[]\" Indicates a negative item  -- DELETE ALL ITEMS NOT EXAMINED]  Vital Signs: (As obtained by patient/caregiver at home)  There were no vitals taken for this visit. Constitutional: [x] Appears well-developed and well-nourished [x] No apparent distress      [] Abnormal -     Mental status: [x] Alert and awake  [x] Oriented to person/place/time [x] Able to follow commands    [] Abnormal -     Eyes:   EOM    [x]  Normal    [] Abnormal -   Sclera  [x]  Normal    [] Abnormal -          Discharge [x]  None visible   [] Abnormal -     HENT: [x] Normocephalic, atraumatic  [] Abnormal -   [x] Mouth/Throat: Mucous membranes are moist    External Ears [x] Normal  [] Abnormal -    Neck: [x] No visualized mass [] Abnormal -     Pulmonary/Chest: [x] Respiratory effort normal   [x] No visualized signs of difficulty breathing or respiratory distress        [] Abnormal -      Musculoskeletal:   [x] Normal gait with no signs of ataxia         [x] Normal range of motion of neck        [] Abnormal -     Neurological:        [x] No Facial Asymmetry (Cranial nerve 7 motor function) (limited exam due to video visit)          [x] No gaze palsy        [] Abnormal -          Skin:        [x] No significant exanthematous lesions or discoloration noted on facial skin         [] Abnormal -            Psychiatric:       [x] Normal Affect [] Abnormal -        [x] No Hallucinations    Other pertinent observable physical exam findings:-      Assessment / Plan    Encounter Diagnoses   Name Primary?  Class 3 obesity Yes    BMI 45.0-49.9, adult ([facility] [address]     Unrefreshed by sleep      Diagnoses and all orders for this visit:    1. Class 3 obesity  -     buPROPion SR (WELLBUTRIN SR) 150 mg SR tablet; Take 1 Tablet by mouth two (2) times a day. Hoping the wellbutrin will help get better appetite control  2. BMI 45.0-49.9, adult ([facility])    3. Unrefreshed by sleep    referred to sleep med for eval      1.   Weight management control uncertain   Progress was reviewed with patient  TRANSITION TO LOW CARB LOW MAYURI DIET 2048-0743 using grocery foods and rudolph protein  ( see patient instructions for template)        Homework: exercise goal is 30 min 5 days a week  Prenatal vits  Start bupropion      2. Labs    Latest results reviewed with patient     TRANSITION TO GROCERY PLAN LCD 0417-9506 mayuri  Low carb diet w max 50 g sugar    face to face time with Radha VILLATORO consisted of counseling & coordinating and/or discussing treatment plans in reference to her obesity The primary encounter diagnosis was Class 3 obesity. Diagnoses of BMI 45.0-49.9, adult ([address] and Unrefreshed by sleep were also pertinent to this visit. Coding Help - Use CPT Codes 81117-13135, 19188-22911 for Established and New Patients respectively, either employing EM elements or Time rules. Other codes (example consult codes) may also apply. We discussed the expected course, resolution and complications of the diagnosis(es) in detail. Medication risks, benefits, costs, interactions, and alternatives were discussed as indicated. I advised her to contact the office if her condition worsens, changes or fails to improve as anticipated. She expressed understanding with the diagnosis(es) and plan. Pursuant to the emergency declaration under the [address] [facility] [address] waiver authority and the [employer] General Act, this Virtual  Visit was conducted, with patient's consent, to reduce the patient's risk of exposure to COVID-19 and provide continuity of care for an established patient. Services were provided through a video synchronous discussion virtually to substitute for in-person clinic visit.     Kayce Rizzo MD

## 2021-11-09 ENCOUNTER — TELEPHONE (OUTPATIENT)
Dept: SLEEP MEDICINE | Age: 26
End: 2021-11-09

## 2021-11-17 ENCOUNTER — OFFICE VISIT (OUTPATIENT)
Dept: SLEEP MEDICINE | Age: 26
End: 2021-11-17
Payer: COMMERCIAL

## 2021-11-17 VITALS
BODY MASS INDEX: 48.82 KG/M2 | OXYGEN SATURATION: 100 % | HEIGHT: 65 IN | HEART RATE: 97 BPM | DIASTOLIC BLOOD PRESSURE: 74 MMHG | WEIGHT: 293 LBS | SYSTOLIC BLOOD PRESSURE: 130 MMHG

## 2021-11-17 DIAGNOSIS — E66.01 MORBID OBESITY WITH BMI OF 45.0-49.9, ADULT (HCC): ICD-10-CM

## 2021-11-17 DIAGNOSIS — G47.33 OSA (OBSTRUCTIVE SLEEP APNEA): Primary | ICD-10-CM

## 2021-11-17 PROCEDURE — 99204 OFFICE O/P NEW MOD 45 MIN: CPT | Performed by: SPECIALIST

## 2021-11-17 NOTE — PROGRESS NOTES
[address] [facility] [place] [place], [address]  Tel.  [phone]  Fax. 100 [facility] 60  [facility], [address] [facility]  Tel.  [phone]  Fax. [phone] [address]  Tel.  [phone]  Fax. [phone]       Chief Complaint       Chief Complaint   Patient presents with    Sleep Problem     F2F NP_possible sleep apnea       HPI      Rosy Nelson is 27 y.o. female seen for evaluation of a sleep disorder. She normally retires between 10-11 PM and will get out of bed at at 5 AM.  She may awaken 2-3 times during the night. She has been told of snoring, prominent at times. She describes self as tired on awakening and tired during the day. She may nap several times a week with mixed benefit. She denies excessive daytime sleepiness. She does not experience vivid dreaming or nightmares, sleep talking or sleepwalking, bruxism or nocturnal incontinence, abnormal arm or leg movements, hypnagogic hallucinations, sleep paralysis or cataplexy. The patient has not undergone diagnostic testing for the current problems. Stevenson Sleepiness Score: 2       No Known Allergies    Current Outpatient Medications   Medication Sig Dispense Refill    buPROPion SR (WELLBUTRIN SR) 150 mg SR tablet Take 1 Tablet by mouth two (2) times a day. 60 Tablet 0    metFORMIN ER (GLUCOPHAGE XR) 500 mg tablet metformin  mg tablet,extended release 24 hr   TAKE 1 TABLET BY MOUTH TWICE DAILY **Needs visit for further refills      NORETHINDRONE-E.ESTRADIOL-IRON (LO LOESTRIN FE PO) Take  by mouth. (Patient not taking: Reported on 11/17/2021)          She  has a past medical history of Arrhythmia, Headache, and Morbid obesity ([address] She  has a past surgical history that includes hx colonoscopy. She family history includes Hypertension in her father and mother. She  reports that she has never smoked.  She has never used smokeless tobacco. She reports current alcohol use of about 10.0 standard drinks of alcohol per week. She reports that she does not use drugs. Review of Systems:  Review of Systems   Constitutional: Negative for chills and fever. HENT: Positive for tinnitus. Negative for hearing loss. Eyes: Positive for blurred vision. Negative for double vision. Respiratory: Negative for cough and shortness of breath. Cardiovascular: Positive for palpitations. Gastrointestinal: Positive for heartburn. Genitourinary: Negative for frequency and urgency. Musculoskeletal: Negative for back pain and neck pain. Neurological: Negative for dizziness and headaches. Psychiatric/Behavioral: The patient is nervous/anxious. Objective:     Visit Vitals  /74 (BP 1 Location: Left arm, BP Patient Position: Sitting, BP Cuff Size: Adult)   Pulse 97   Ht 5' 5\" (1.651 m)   Wt 298 lb (135.2 kg)   SpO2 100%   BMI 49.59 kg/m²     Body mass index is 49.59 kg/m². General:   Conversant, cooperative   Eyes:  Pupils equal and reactive, no nystagmus   Oropharynx:   Mallampati score IV, tongue normal       Neck:   No carotid bruits; Chest/Lungs:  Clear on auscultation    CVS:  Normal rate, regular rhythm   Skin:  Warm to touch; no obvious rashes   Neuro:  Speech fluent, face symmetrical, tongue movement normal   Psych:  Normal affect,  normal countenance        Assessment:       ICD-10-CM ICD-9-CM    1. STEPHANI (obstructive sleep apnea)  G47.33 327.23    2. Morbid obesity with BMI of 45.0-49.9, adult ([facility]  E66.01 278.01     Z68.42 V85.42        History of potential sleep disordered breathing. Patient does have a narrow posterior oral airway. Sleep breathing abnormalities may be more prominent when supine, and/or in rem sleep. Would benefit from weight reduction. Weight loss measures less effective if sleep disordered breathing not treated. Plan:     No orders of the defined types were placed in this encounter.       * Patient has a history and examination consistent with the diagnosis of sleep apnea. *Home sleep testing was ordered for initial evaluation. * She was provided information on sleep apnea including corresponding risk factors and the importance of proper treatment. * Treatment options if indicated were reviewed today. Instructions:  o The patient would benefit from weight reduction measures. o Do not engage in activities requiring a normal degree of alertness if fatigue is present. o The patient understands that untreated or undertreated sleep apnea could impair judgement and the ability to function normally during the day.  o Call or return if symptoms worsen or persist.          Florida Solis MD, FAA  Electronically signed 11/17/21       This note was created using voice recognition software. Despite editing, there may be syntax errors. This note will not be viewable in [address]

## 2021-12-01 ENCOUNTER — HOSPITAL ENCOUNTER (OUTPATIENT)
Dept: SLEEP MEDICINE | Age: 26
Discharge: HOME | End: 2021-12-01
Payer: COMMERCIAL

## 2021-12-01 ENCOUNTER — OFFICE VISIT (OUTPATIENT)
Dept: SLEEP MEDICINE | Age: 26
End: 2021-12-01

## 2021-12-01 DIAGNOSIS — G47.33 OSA (OBSTRUCTIVE SLEEP APNEA): Primary | ICD-10-CM

## 2021-12-01 PROCEDURE — 95806 SLEEP STUDY UNATT&RESP EFFT: CPT

## 2021-12-01 NOTE — PROGRESS NOTES
[address] [facility] [place] [place], [address]  Tel.  [phone]  Fax. 100 [facility] 60  [place], [address] [facility]  Tel.  [phone]  Fax. [phone] [address] [facility]  Tel.  [phone]  Fax. [phone]       S>Radha Mack is a 27 y.o. female seen today to receive a home sleep testing unit (HST). · Patient was educated on proper hookup and operation of the HST. · Instruction forms and documentation were reviewed and signed. · The patient demonstrated good understanding of the HST.    O>    There were no vitals taken for this visit. A>  No diagnosis found. P>  · General information regarding operations and maintenance of the device was provided. · She was provided information on sleep apnea including coresponding risk factors and the importance of proper treatment. · Follow-up appointment was made to return the HST. She will be contacted once the results have been reviewed. · She was asked to contact our office for any problems regarding her home sleep test study.    · HSAT SN # [id number]

## 2021-12-10 ENCOUNTER — TELEPHONE (OUTPATIENT)
Dept: SLEEP MEDICINE | Age: 26
End: 2021-12-10

## 2021-12-10 NOTE — TELEPHONE ENCOUNTER
HSAT demonstrates AHI of 1 per hour associated with minimal SaO2 of 90   percent. Snoring during 40.4  of the study. Episodes of poor effort signal.    Impression: HSAT does not demonstrate significant sleep disordered breathing. Snoring may improve with weight reduction. Consider repeat HSAT if sleep symptoms worsen. Sleep technologist: Please advise patient of HSAT results.

## 2022-10-07 ENCOUNTER — TELEPHONE (OUTPATIENT)
Dept: SURGERY | Age: 27
End: 2022-10-07

## 2022-10-07 NOTE — TELEPHONE ENCOUNTER
Attempted to call the patient about a missed appointment on 10/6/22 with . We had to leave a message and a no show letter was sent out.